# Patient Record
Sex: MALE | Race: BLACK OR AFRICAN AMERICAN | NOT HISPANIC OR LATINO | URBAN - METROPOLITAN AREA
[De-identification: names, ages, dates, MRNs, and addresses within clinical notes are randomized per-mention and may not be internally consistent; named-entity substitution may affect disease eponyms.]

---

## 2020-07-28 ENCOUNTER — EMERGENCY (EMERGENCY)
Facility: HOSPITAL | Age: 35
LOS: 1 days | Discharge: HOME | End: 2020-07-28
Admitting: EMERGENCY MEDICINE
Payer: MEDICAID

## 2020-07-28 VITALS
OXYGEN SATURATION: 95 % | TEMPERATURE: 99 F | HEART RATE: 93 BPM | SYSTOLIC BLOOD PRESSURE: 145 MMHG | RESPIRATION RATE: 18 BRPM | DIASTOLIC BLOOD PRESSURE: 83 MMHG

## 2020-07-28 VITALS
OXYGEN SATURATION: 94 % | HEIGHT: 72 IN | HEART RATE: 110 BPM | RESPIRATION RATE: 18 BRPM | TEMPERATURE: 98 F | SYSTOLIC BLOOD PRESSURE: 146 MMHG | WEIGHT: 300.05 LBS | DIASTOLIC BLOOD PRESSURE: 76 MMHG

## 2020-07-28 DIAGNOSIS — Z88.8 ALLERGY STATUS TO OTHER DRUGS, MEDICAMENTS AND BIOLOGICAL SUBSTANCES STATUS: ICD-10-CM

## 2020-07-28 DIAGNOSIS — M79.89 OTHER SPECIFIED SOFT TISSUE DISORDERS: ICD-10-CM

## 2020-07-28 DIAGNOSIS — L03.116 CELLULITIS OF LEFT LOWER LIMB: ICD-10-CM

## 2020-07-28 DIAGNOSIS — R40.0 SOMNOLENCE: ICD-10-CM

## 2020-07-28 LAB — GLUCOSE BLDC GLUCOMTR-MCNC: 102 MG/DL — HIGH (ref 70–99)

## 2020-07-28 PROCEDURE — 93970 EXTREMITY STUDY: CPT | Mod: 26

## 2020-07-28 PROCEDURE — 73630 X-RAY EXAM OF FOOT: CPT | Mod: 26,LT

## 2020-07-28 PROCEDURE — 99284 EMERGENCY DEPT VISIT MOD MDM: CPT | Mod: 25

## 2020-07-28 RX ORDER — CEPHALEXIN 500 MG
1 CAPSULE ORAL
Qty: 20 | Refills: 0
Start: 2020-07-28 | End: 2020-08-06

## 2020-07-28 RX ORDER — FUROSEMIDE 40 MG
40 TABLET ORAL ONCE
Refills: 0 | Status: COMPLETED | OUTPATIENT
Start: 2020-07-28 | End: 2020-07-28

## 2020-07-28 RX ORDER — CEPHALEXIN 500 MG
500 CAPSULE ORAL ONCE
Refills: 0 | Status: COMPLETED | OUTPATIENT
Start: 2020-07-28 | End: 2020-07-28

## 2020-07-28 RX ORDER — FUROSEMIDE 40 MG
1 TABLET ORAL
Qty: 7 | Refills: 0
Start: 2020-07-28 | End: 2020-08-03

## 2020-07-28 RX ORDER — METFORMIN HYDROCHLORIDE 850 MG/1
500 TABLET ORAL ONCE
Refills: 0 | Status: COMPLETED | OUTPATIENT
Start: 2020-07-28 | End: 2020-07-28

## 2020-07-28 RX ORDER — IBUPROFEN 200 MG
600 TABLET ORAL ONCE
Refills: 0 | Status: DISCONTINUED | OUTPATIENT
Start: 2020-07-28 | End: 2020-08-01

## 2020-07-28 RX ORDER — METFORMIN HYDROCHLORIDE 850 MG/1
1 TABLET ORAL
Qty: 14 | Refills: 0
Start: 2020-07-28 | End: 2020-08-03

## 2020-07-28 RX ORDER — CEFUROXIME AXETIL 250 MG
250 TABLET ORAL ONCE
Refills: 0 | Status: DISCONTINUED | OUTPATIENT
Start: 2020-07-28 | End: 2020-07-28

## 2020-07-28 RX ADMIN — Medication 40 MILLIGRAM(S): at 14:00

## 2020-07-28 RX ADMIN — METFORMIN HYDROCHLORIDE 500 MILLIGRAM(S): 850 TABLET ORAL at 14:00

## 2020-07-28 RX ADMIN — Medication 500 MILLIGRAM(S): at 15:54

## 2020-07-28 NOTE — ED PROVIDER NOTE - OBJECTIVE STATEMENT
Pt is a 36 y/o male PMHx DM, non-compliant with Lasix (40mg) or Metformin (100mg) for the past 6 months, presents to ED with bilateral leg swelling and pain. Pt states that he is currently visiting from [place] and has been staying "outside" in [place]. Pt states that he also twisted his left foot on the sidewalk and believes it is broken. Pt is somnolent on exam. Pt denies fever, nausea, diarrhea, vomiting, chills, CP, cough, or SOB. Pt is a 36 y/o male PMHx DM, non-compliant with Lasix (40mg) or Metformin (500mg) for the past 6 months, presents to ED with bilateral leg swelling and pain. Pt states that he is currently visiting from [place] and has been staying "outside" in [place]. Pt states that he also twisted his left foot on the sidewalk and believes it is broken. Pt is somnolent on exam. Pt denies fever, nausea, diarrhea, vomiting, chills, CP, cough, or SOB.

## 2020-07-28 NOTE — ED ADULT NURSE NOTE - NSIMPLEMENTINTERV_GEN_ALL_ED
Implemented All Fall Risk Interventions:  [place] to call system. Call bell, personal items and telephone within reach. Instruct patient to call for assistance. Room bathroom lighting operational. Non-slip footwear when patient is off stretcher. Physically safe environment: no spills, clutter or unnecessary equipment. Stretcher in lowest position, wheels locked, appropriate side rails in place. Provide visual cue, wrist band, yellow gown, etc. Monitor gait and stability. Monitor for mental status changes and reorient to person, place, and time. Review medications for side effects contributing to fall risk. Reinforce activity limits and safety measures with patient and family.

## 2020-07-28 NOTE — ED PROVIDER NOTE - CLINICAL SUMMARY MEDICAL DECISION MAKING FREE TEXT BOX
Pt is a 34 y/o male presents to ED with bilateral leg swelling and pain. Will order antimicrobials, bilateral US, and X-ray of left foot to r/o fracture. Pt is a 36 y/o male presents to ED with acute on chronic bilateral leg swelling and pain in the setting of medication noncompliance. Will order antibiotics, bilateral US, and X-ray of left foot to r/o fracture. there is a possible cellulitis, small area of redness to left foot, but could also be contributed to chronic swelling from lasix noncompliance. given h/o diabetes will treat with keflex in an abundance of caution.

## 2020-07-28 NOTE — ED ADULT NURSE NOTE - OBJECTIVE STATEMENT
Pt aox3.  Comes in w/ c/o b/l leg swelling for approx 1 week; +2/3 pitting edema; non weeping.  Pt states "I stopped taking my water pill"  Pt also rpts sob and b/l leg pain.  Pt goes in and out of sleep while talking.

## 2020-07-28 NOTE — ED PROVIDER NOTE - CONSTITUTIONAL, MLM
normal... Pt is unkempt, somnolent, and clinically obese. Awake, alert, oriented to person, place, time/situation.

## 2020-07-28 NOTE — ED PROVIDER NOTE - RESPIRATORY, MLM
Breath sounds clear and equal bilaterally. Breath sounds clear and equal bilaterally. no rales or rhonchi.

## 2020-07-28 NOTE — ED PROVIDER NOTE - PATIENT PORTAL LINK FT
You can access the FollowMyHealth Patient Portal offered by [facility] by registering at the following website: [email or web address]. By joining [employer]’s FollowMyHealth portal, you will also be able to view your health information using other applications (apps) compatible with our system.

## 2020-07-28 NOTE — ED PROVIDER NOTE - SKIN, MLM
Skin normal color for race, warm, dry and intact. Skin normal color for race, warm, dry and intact, minimal erythema over left foot with 2+ bilat pitting edema, L>R, 2+ dp/pt pulses equal bilat, skin intact.

## 2020-07-28 NOTE — ED PROVIDER NOTE - NSFOLLOWUPINSTRUCTIONS_ED_ALL_ED_FT
IT IS REALLY IMPORTANT THAT YOU TAKE YOUR MEDICATIONS AS PRESCRIBED. I HAVE PRESCRIBED YOUR DIABETES MEDICATION (METFORMIN) AND YOUR WATER PILL (LASIX) FOR THE WEEK YOU ARE VISITING. SEE YOUR DOCTOR WHEN YOU GET BACK TO [place] TO REFILL YOUR PRESCRIPTIONS.     I HAVE ALSO PRESCRIBED ANTIBIOTICS FOR YOUR FOOT. KEEP YOUR FOOT ELEVATED OR THE ANTIBIOTICS WILL NOT BE AS EFFECTIVE.     YOUR X RAYS AND ULTRASOUND WERE UNREMARKABLE FOR ANY FRACTURES OR BLOOD CLOTS WHICH COULD BE CONTRIBUTING TO YOUR SWELLING AND DISCOMFORT. REST YOUR LEG. USE ICE TO REDUCE PAIN AND SWELLING. WEAR ACE WRAP FOR COMPRESSION. ELEVATE YOUR LEG WHENEVER POSSIBLE.     RETURN TO ER FOR FEVER, CHILLS, EXPANDING REDNESS, WARMTH, WOUNDS, DIZZINESS, ANY OTHER CONCERNS.     Cellulitis    Cellulitis is a skin infection caused by bacteria. This condition occurs most often in the arms and lower legs but can occur anywhere over the body. Symptoms include redness, swelling, warm skin, tenderness, and chills/fever. If you were prescribed an antibiotic medicine, take it as told by your health care provider. Do not stop taking the antibiotic even if you start to feel better.    SEEK IMMEDIATE MEDICAL CARE IF YOU HAVE ANY OF THE FOLLOWING SYMPTOMS: worsening fever, red streaks coming from affected area, vomiting or diarrhea, or dizziness/lightheadedness.